# Patient Record
Sex: FEMALE | Race: WHITE | URBAN - METROPOLITAN AREA
[De-identification: names, ages, dates, MRNs, and addresses within clinical notes are randomized per-mention and may not be internally consistent; named-entity substitution may affect disease eponyms.]

---

## 2024-10-23 ENCOUNTER — OFFICE VISIT (OUTPATIENT)
Dept: GASTROENTEROLOGY | Facility: CLINIC | Age: 44
End: 2024-10-23
Payer: COMMERCIAL

## 2024-10-23 ENCOUNTER — TELEPHONE (OUTPATIENT)
Facility: CLINIC | Age: 44
End: 2024-10-23

## 2024-10-23 VITALS
HEIGHT: 64 IN | HEART RATE: 85 BPM | SYSTOLIC BLOOD PRESSURE: 105 MMHG | DIASTOLIC BLOOD PRESSURE: 69 MMHG | WEIGHT: 117 LBS | BODY MASS INDEX: 19.97 KG/M2

## 2024-10-23 DIAGNOSIS — Z12.11 COLON CANCER SCREENING: ICD-10-CM

## 2024-10-23 DIAGNOSIS — Z12.11 COLON CANCER SCREENING: Primary | ICD-10-CM

## 2024-10-23 DIAGNOSIS — K59.00 CONSTIPATION, UNSPECIFIED CONSTIPATION TYPE: Primary | ICD-10-CM

## 2024-10-23 PROBLEM — C44.41 BASAL CELL CARCINOMA (BCC) OF SCALP: Status: ACTIVE | Noted: 2019-10-04

## 2024-10-23 PROCEDURE — 99244 OFF/OP CNSLTJ NEW/EST MOD 40: CPT | Performed by: NURSE PRACTITIONER

## 2024-10-23 PROCEDURE — 3078F DIAST BP <80 MM HG: CPT | Performed by: NURSE PRACTITIONER

## 2024-10-23 PROCEDURE — 3074F SYST BP LT 130 MM HG: CPT | Performed by: NURSE PRACTITIONER

## 2024-10-23 PROCEDURE — 3008F BODY MASS INDEX DOCD: CPT | Performed by: NURSE PRACTITIONER

## 2024-10-23 RX ORDER — POLYETHYLENE GLYCOL 3350, SODIUM CHLORIDE, SODIUM BICARBONATE, POTASSIUM CHLORIDE 420; 11.2; 5.72; 1.48 G/4L; G/4L; G/4L; G/4L
POWDER, FOR SOLUTION ORAL
Qty: 4000 ML | Refills: 0 | Status: SHIPPED | OUTPATIENT
Start: 2024-10-23

## 2024-10-23 NOTE — PATIENT INSTRUCTIONS
-miralax    1. Schedule colonoscopy with IV or MAC kate/ general pool MD [Diagnosis: crc screening]    2.  bowel prep from pharmacy ([facility])    3.   For cardiology patients and patients on blood thinners:  Please contact your cardiology clinic for clearance to proceed with the endoscopic procedure. If you are on blood thinners, please also confirm with your cardiologic clinic that you are able to hold the blood thinner per our recommendations.\"    BLOOD THINNER ORDERS:  -Hold for 48 hours (Xarelto, Eliquis, Pradaxa, Savaysa)  -Hold for 3 days (Pletal)  -Hold for 5 days (Coumadin, Plavix, Brilinta, Aggrenox)  -Hold for 7 days (Effient)     For endocrinology insulin patients:    Please contact your endocrinology clinic for insulin adjustment orders prior to your endoscopic procedure.    4. Read all bowel prep instructions carefully. Bowel prep instructions can also be found online at:  www.eehealth.org/giprep     5. AVOID seeds, nuts, popcorn, raw fruits and vegetables for 3 days before procedure    6.  If you start any NEW medication after your visit today, please notify us. Certain medications (like iron or weight loss medications) will need to be held before the procedure, or the procedure cannot be performed safely.

## 2024-10-23 NOTE — TELEPHONE ENCOUNTER
Scheduled for: Colonoscopy 28370    Provider Name: Dr Gallardo    Date: Sat 11/09/2024   Location: [facility]  Sedation: IV    Time: 12:05 pm, (pt is aware that Endo will call the day before to confirm arrival time)  Prep: split trilyte    Meds/Allergies Reconciled?: reviewed by provider  Diagnosis with codes: colon screening Z12.11   Was patient informed to call insurance with codes (Y/N): Yes   Referral sent?: Yes, BCBS PPO    [facility] or [facility] notified?: I sent an electronic request to Endo Scheduling and received a confirmation today.     Medication Orders: Patient is aware to NOT take iron pills, herbal meds and diet supplements for 7 days before exam. Also to NOT take any form of alcohol, recreational drugs and any forms of ED meds 24-72 hours before exam.     Misc Orders: MyChart instructions       Further instructions given by staff: Instructions given in office and pt verbalized understanding

## 2024-10-23 NOTE — H&P
[facility] - Gastroenterology                                                                                                               Reason for consult: eval    Requesting physician or provider: No primary care provider on file.    Chief Complaint   Patient presents with    Colonoscopy Screening       HPI:   Cary Segundo is a 44 year old year-old female without significant PMHx:    she is here today for evaluation prior to cln    she moves her bowels 3-4x/week and without recent change. she denies straining and/or incomplete evacuation.  she denies brbpr and/or melena.    she denies acid reflux and/or heartburn. she denies dysphagia, odynophagia and/or globus. she denies abdominal pain. she denies nausea and/or vomiting.  she denies recent change in appetite and/or unintentional weight loss.    NSAIDS: prn  Tobacco: no  Alcohol: rare  Marijuana: no  Illicit drugs: no    No FH GI malignancy    No history of adverse reaction to sedation  No ANASTASIA  No anticoagulants  No pacemaker/defibrillator  No pain medications and/or sleep aides      Last colonoscopy: no  Last EGD: no    Wt Readings from Last 6 Encounters:   10/23/24 117 lb (53.1 kg)        History, Medications, Allergies, ROS:      History reviewed. No pertinent past medical history.   History reviewed. No pertinent surgical history.   Family Hx: History reviewed. No pertinent family history.   Social History:   Social History     Socioeconomic History    Marital status:    Tobacco Use    Smoking status: Never    Smokeless tobacco: Never   Substance and Sexual Activity    Alcohol use: Yes    Drug use: Not Currently     Social Drivers of Health     Financial Resource Strain: Low Risk  (2/29/2024)    Received from [facility]    Overall Financial Resource Strain (CARDIA)     Difficulty of Paying Living Expenses: Not hard at all   Food  Insecurity: No Food Insecurity (2/29/2024)    Received from [facility]    Hunger Vital Sign     Worried About Running Out of Food in the Last Year: Never true     Ran Out of Food in the Last Year: Never true   Transportation Needs: No Transportation Needs (2/29/2024)    Received from [facility]    PRAPARE - Transportation     Lack of Transportation (Medical): No     Lack of Transportation (Non-Medical): No   Housing Stability: Low Risk  (2/29/2024)    Received from [facility]    Housing Stability Vital Sign     Unable to Pay for Housing in the Last Year: No     Number of Places Lived in the Last Year: 1     Unstable Housing in the Last Year: No        Medications (Active prior to today's visit):  No current outpatient medications on file.       Allergies:  Allergies[1]    ROS:   CONSTITUTIONAL: negative for fevers, chills, sweats and weight loss  EYES Negative for red eyes, yellow eyes, changes in vision  HEENT: Negative for dysphagia and hoarseness  RESPIRATORY: Negative for cough and shortness of breath  CARDIOVASCULAR: Negative for chest pain, palpitations  GASTROINTESTINAL: See HPI  GENITOURINARY: Negative for dysuria and frequency  MUSCULOSKELETAL: Negative for arthralgias and myalgias  NEUROLOGICAL: Negative for dizziness and headaches  BEHAVIOR/PSYCH: Negative for anxiety and poor appetite    PHYSICAL EXAM:   Blood pressure 105/69, pulse 85, height 5' 4\" (1.626 m), weight 117 lb (53.1 kg).    GEN: WD/WN, NAD  HEENT: Supple symmetrical, trachea midline  CV: RRR, the extremities are warm and well perfused   LUNGS: No increased work of breathing  ABDOMEN: No scars, normal bowel sounds, soft, non-tender, non-distended no rebound or guarding, no masses, no hepatomegaly  MSK: No redness, no warmth, no swelling of joints  SKIN: No jaundice, no erythema, no rashes  HEMATOLOGIC: No bleeding, no bruising  NEURO: Alert and interactive, normal  gait    Labs/Imaging/Procedures:     Patient's pertinent labs and imaging were reviewed and discussed with patient today.        .  ASSESSMENT/PLAN:   Cary Segundo is a 44 year old year-old female without significant PMHx:    #constipation  Chronic and w/o red flags.  CBC, TSH normal 3/2024.  No fhx gi malignancy. Has not had cln and due for screening.    -miralax    1. Schedule colonoscopy with IV or MAC w/ general pool MD [Diagnosis: crc screening]    2.  bowel prep from pharmacy ([facility])    3.   For cardiology patients and patients on blood thinners:  Please contact your cardiology clinic for clearance to proceed with the endoscopic procedure. If you are on blood thinners, please also confirm with your cardiologic clinic that you are able to hold the blood thinner per our recommendations.\"    BLOOD THINNER ORDERS:  -Hold for 48 hours (Xarelto, Eliquis, Pradaxa, Savaysa)  -Hold for 3 days (Pletal)  -Hold for 5 days (Coumadin, Plavix, Brilinta, Aggrenox)  -Hold for 7 days (Effient)     For endocrinology insulin patients:    Please contact your endocrinology clinic for insulin adjustment orders prior to your endoscopic procedure.    4. Read all bowel prep instructions carefully. Bowel prep instructions can also be found online at:  www.[email or web address]/giprep     5. AVOID seeds, nuts, popcorn, raw fruits and vegetables for 3 days before procedure    6.  If you start any NEW medication after your visit today, please notify us. Certain medications (like iron or weight loss medications) will need to be held before the procedure, or the procedure cannot be performed safely.        Orders This Visit:  No orders of the defined types were placed in this encounter.      Meds This Visit:  Requested Prescriptions      No prescriptions requested or ordered in this encounter       Imaging & Referrals:  None    ENDOSCOPIC RISK BENEFIT DISCUSSION: I described the procedure in great detail with the patient. I discussed  the risks and benefits, including but not limited to: bleeding, perforation, infection, anesthesia complications, and even death. Patient will be NPO after midnight and will have a person physically present at time of pick-up to drive patient home. Patient verbalized understanding and agrees to proceed with procedure as planned.    Soha Trejo, APRN   10/23/2024        This note was partially prepared using Dragon Medical voice recognition dictation software. As a result, errors may occur. When identified, these errors have been corrected. While every attempt is made to correct errors during dictation, discrepancies may still exist.          [1]   Allergies  Allergen Reactions    Amoxicillin HIVES    Bee Venom SWELLING     marked local swelling of her hand that required steroids.    Cerner Allergy Text Annotation: Bee Stings, Cerner Reaction: swelling    Seasonal SWELLING

## 2024-10-23 NOTE — TELEPHONE ENCOUNTER
Schedulers, see providers orders below:     -Patient was seen in office today and was provided with written and verbal procedure prep instructions, including any medication adjustments.   -Patient was advised to call medical insurance for any questions on benefits and/or any out of pocket costs.   -Patient is aware that the GI schedulers will be calling to schedule procedure(s) and that providers may not have any availability until possibly end of the year.           Instructions and Information about Your Health       -[employer]     1. Schedule colonoscopy with IV or MAC w/ general pool MD [Diagnosis: crc screening]     2.  bowel prep from pharmacy ([facility])     3.   For cardiology patients and patients on blood thinners:  Please contact your cardiology clinic for clearance to proceed with the endoscopic procedure. If you are on blood thinners, please also confirm with your cardiologic clinic that you are able to hold the blood thinner per our recommendations.\"     BLOOD THINNER ORDERS:  -Hold for 48 hours (Xarelto, Eliquis, Pradaxa, Savaysa)  -Hold for 3 days (Pletal)  -Hold for 5 days (Coumadin, Plavix, Brilinta, Aggrenox)  -Hold for 7 days (Effient)      For endocrinology insulin patients:     Please contact your endocrinology clinic for insulin adjustment orders prior to your endoscopic procedure.     4. Read all bowel prep instructions carefully. Bowel prep instructions can also be found online at:  www.eehealth.org/giprep      5. AVOID seeds, nuts, popcorn, raw fruits and vegetables for 3 days before procedure     6.  If you start any NEW medication after your visit today, please notify us. Certain medications (like iron or weight loss medications) will need to be held before the procedure, or the procedure cannot be performed safely.

## 2024-10-23 NOTE — TELEPHONE ENCOUNTER
Rescheduled for: Colonoscopy 49193    Provider Name: Dr Gallardo  TO Dr. Hurt   Date: Sat 11/09/2024 TO 2/28/2025  Location: [facility]  Sedation: IV  TO Mac  Time: 12:05 pm, TO 10:50 (pt is aware that Endo will call the day before to confirm arrival time)  Prep: split trilyte    Meds/Allergies Reconciled?: reviewed by provider  Diagnosis with codes: colon screening Z12.11   Was patient informed to call insurance with codes (Y/N): Yes   Referral sent?: Yes, BCBS PPO    EM or [facility] notified?: I sent an electronic request to Endo Scheduling and received a confirmation today.  Medication Orders: Patient is aware to NOT take iron pills, herbal meds and diet supplements for 7 days before exam. Also to NOT take any form of alcohol, recreational drugs and any forms of ED meds 24-72 hours before exam.  Misc Orders: MyChart instructions       Further instructions given by staff:I discussed the prep instructions with the patient which she verbally understood and is aware that I will mychart  the instructions today.

## 2025-02-13 ENCOUNTER — TELEPHONE (OUTPATIENT)
Facility: CLINIC | Age: 45
End: 2025-02-13

## 2025-05-16 RX ORDER — LEVOTHYROXINE SODIUM 75 UG/1
75 TABLET ORAL
COMMUNITY

## 2025-05-23 ENCOUNTER — ANESTHESIA EVENT (OUTPATIENT)
Dept: ENDOSCOPY | Facility: HOSPITAL | Age: 45
End: 2025-05-23
Payer: COMMERCIAL

## 2025-05-23 ENCOUNTER — ANESTHESIA (OUTPATIENT)
Dept: ENDOSCOPY | Facility: HOSPITAL | Age: 45
End: 2025-05-23
Payer: COMMERCIAL

## 2025-05-23 ENCOUNTER — HOSPITAL ENCOUNTER (OUTPATIENT)
Facility: HOSPITAL | Age: 45
Setting detail: HOSPITAL OUTPATIENT SURGERY
Discharge: HOME | End: 2025-05-23
Attending: STUDENT IN AN ORGANIZED HEALTH CARE EDUCATION/TRAINING PROGRAM | Admitting: STUDENT IN AN ORGANIZED HEALTH CARE EDUCATION/TRAINING PROGRAM
Payer: COMMERCIAL

## 2025-05-23 VITALS
WEIGHT: 120 LBS | BODY MASS INDEX: 22.66 KG/M2 | HEART RATE: 52 BPM | RESPIRATION RATE: 14 BRPM | HEIGHT: 61 IN | SYSTOLIC BLOOD PRESSURE: 101 MMHG | OXYGEN SATURATION: 100 % | DIASTOLIC BLOOD PRESSURE: 63 MMHG

## 2025-05-23 DIAGNOSIS — Z12.11 COLON CANCER SCREENING: ICD-10-CM

## 2025-05-23 LAB — B-HCG UR QL: NEGATIVE

## 2025-05-23 PROCEDURE — 45385 COLONOSCOPY W/LESION REMOVAL: CPT | Performed by: STUDENT IN AN ORGANIZED HEALTH CARE EDUCATION/TRAINING PROGRAM

## 2025-05-23 RX ORDER — LIDOCAINE HYDROCHLORIDE 10 MG/ML
INJECTION, SOLUTION EPIDURAL; INFILTRATION; INTRACAUDAL; PERINEURAL AS NEEDED
Status: DISCONTINUED | OUTPATIENT
Start: 2025-05-23 | End: 2025-05-23 | Stop reason: SURG

## 2025-05-23 RX ORDER — SODIUM CHLORIDE, SODIUM LACTATE, POTASSIUM CHLORIDE, CALCIUM CHLORIDE 600; 310; 30; 20 MG/100ML; MG/100ML; MG/100ML; MG/100ML
INJECTION, SOLUTION INTRAVENOUS CONTINUOUS
Status: DISCONTINUED | OUTPATIENT
Start: 2025-05-23 | End: 2025-05-23

## 2025-05-23 RX ORDER — NALOXONE HYDROCHLORIDE 0.4 MG/ML
0.08 INJECTION, SOLUTION INTRAMUSCULAR; INTRAVENOUS; SUBCUTANEOUS ONCE AS NEEDED
Status: DISCONTINUED | OUTPATIENT
Start: 2025-05-23 | End: 2025-05-23

## 2025-05-23 RX ADMIN — LIDOCAINE HYDROCHLORIDE 50 MG: 10 INJECTION, SOLUTION EPIDURAL; INFILTRATION; INTRACAUDAL; PERINEURAL at 10:08:00

## 2025-05-23 RX ADMIN — SODIUM CHLORIDE, SODIUM LACTATE, POTASSIUM CHLORIDE, CALCIUM CHLORIDE: 600; 310; 30; 20 INJECTION, SOLUTION INTRAVENOUS at 10:03:00

## 2025-05-23 NOTE — ANESTHESIA PREPROCEDURE EVALUATION
Anesthesia PreOp Note    HPI:     Cary Segundo is a 44 year old female who presents for preoperative consultation requested by: Rajinder Hurt MD    Date of Surgery: 5/23/2025    Procedure(s):  COLONOSCOPY  Indication: Colon cancer screening    Relevant Problems   No relevant active problems       NPO:  Last Liquid Consumption Date: 05/23/25  Last Liquid Consumption Time: 0615  Last Solid Consumption Date: 05/22/25  Last Solid Consumption Time: 0900  Last Liquid Consumption Date: 05/23/25          History Review:  Patient Active Problem List    Diagnosis Date Noted    Basal cell carcinoma (BCC) of scalp 10/04/2019       Past Medical History[1]    Past Surgical History[2]    Prescriptions Prior to Admission[3]  Current Medications and Prescriptions Ordered in Epic[4]    Allergies[5]    Family History[6]  Social Hx on file[7]    Available pre-op labs reviewed.             Vital Signs:  Body mass index is 22.67 kg/m².   height is 1.549 m (5' 1\") and weight is 54.4 kg (120 lb). Her blood pressure is 108/66 and her pulse is 53. Her respiration is 18 and oxygen saturation is 100%.   Vitals:    05/16/25 1524 05/23/25 1001   BP:  108/66   Pulse:  53   Resp:  18   SpO2:  100%   Weight: 54.4 kg (120 lb)    Height: 1.549 m (5' 1\")         Anesthesia Evaluation     Patient summary reviewed and Nursing notes reviewed    No history of anesthetic complications   Airway   Mallampati: I  TM distance: >3 FB  Neck ROM: full  Dental - Dentition appears grossly intact     Pulmonary - negative ROS and normal exam    breath sounds clear to auscultation  Cardiovascular - negative ROS  Exercise tolerance: good    Rhythm: regular  Rate: normal    Neuro/Psych - negative ROS     GI/Hepatic/Renal - negative ROS     Endo/Other - negative ROS   Abdominal  - normal exam    Abdomen: soft.                 Anesthesia Plan:   ASA:  1  Plan:   MAC  Informed Consent Plan and Risks Discussed With:  Patient and spouse      I have informed Cary  RAAFL Segundo and/or legal guardian or family member of the nature of the anesthetic plan, benefits, risks including possible dental damage if relevant, major complications, and any alternative forms of anesthetic management.   All of the patient's questions were answered to the best of my ability. The patient desires the anesthetic management as planned.  Tammy Wilcox CRNA  5/23/2025 10:03 AM  Present on Admission:  **None**           [1]   Past Medical History:   Blood disorder    thrombocytopenia    Cancer (HCC)    basal cell to scalp    Disorder of thyroid    Visual impairment    glasses/contacts   [2]   Past Surgical History:  Procedure Laterality Date    Colonoscopy  05/23/2025   [3]   Medications Prior to Admission   Medication Sig Dispense Refill Last Dose/Taking    levothyroxine 75 MCG Oral Tab Take 1 tablet (75 mcg total) by mouth before breakfast.   5/22/2025    PEG 3350-KCl-Na Bicarb-NaCl (TRILYTE) 420 g Oral Recon Soln Take prep as directed by gastro office. May substitute with Trilyte/generic equivalent if needed. 4000 mL 0 Taking   [4]   Current Facility-Administered Medications Ordered in Epic   Medication Dose Route Frequency Provider Last Rate Last Admin    lactated ringers infusion   Intravenous Continuous Rajinder Hurt MD         No current [facility]-ordered outpatient medications on file.   [5]   Allergies  Allergen Reactions    Amoxicillin HIVES    Bee Venom SWELLING     marked local swelling of her hand that required steroids.    Cerner Allergy Text Annotation: Bee Stings, Cerner Reaction: swelling    Seasonal SWELLING   [6] History reviewed. No pertinent family history.  [7]   Social History  Socioeconomic History    Marital status:    Tobacco Use    Smoking status: Never    Smokeless tobacco: Never   Vaping Use    Vaping status: Never Used   Substance and Sexual Activity    Alcohol use: Yes     Comment: occ    Drug use: Not Currently

## 2025-05-23 NOTE — H&P
History & Physical Examination    Patient Name: Cary Segundo  MRN: [id number]  CSN: [id number]  YOB: 1980    Diagnosis: screening for colon cancer    Prescriptions Prior to Admission[1]  Current Hospital Medications[2]    Allergies: Allergies[3]    Past Medical History[4]  Past Surgical History[5]  Family History[6]  Social History     Tobacco Use    Smoking status: Never    Smokeless tobacco: Never   Substance Use Topics    Alcohol use: Yes     Comment: occ       SYSTEM Check if Review is Normal Check if Physical Exam is Normal If not normal, please explain:   HEENT [X ] [ X]    NECK  [X ] [ X]    HEART [X ] [ X]    LUNGS [X ] [ X]    ABDOMEN [X ] [ X]    EXTREMITIES [X ] [ X]    OTHER        I have discussed the risks and benefits and alternatives of the procedure with the patient/family.  They understand and agree to proceed with plan of care.   I have reviewed the History and Physical done within the last 30 days.  Any changes noted above.    Rajinder Hurt MD  [facility] Gastroenterology                   [1]   Medications Prior to Admission   Medication Sig Dispense Refill Last Dose/Taking    levothyroxine 75 MCG Oral Tab Take 1 tablet (75 mcg total) by mouth before breakfast.   Taking    PEG 3350-KCl-Na Bicarb-NaCl (TRILYTE) 420 g Oral Recon Soln Take prep as directed by gastro office. May substitute with Trilyte/generic equivalent if needed. 4000 mL 0 Taking   [2]   Current Facility-Administered Medications   Medication Dose Route Frequency    lactated ringers infusion   Intravenous Continuous   [3]   Allergies  Allergen Reactions    Amoxicillin HIVES    Bee Venom SWELLING     marked local swelling of her hand that required steroids.    Cerner Allergy Text Annotation: Bee Stings, Cerner Reaction: swelling    Seasonal SWELLING   [4]   Past Medical History:   Blood disorder    thrombocytopenia    Cancer (HCC)    basal cell to scalp    Disorder of thyroid    Visual impairment     glasses/contacts   [5]   Past Surgical History:  Procedure Laterality Date    Colonoscopy  05/23/2025   [6] History reviewed. No pertinent family history.

## 2025-05-23 NOTE — DISCHARGE INSTRUCTIONS
Home Care Instructions for Colonoscopy with Sedation    Diet:  - Resume your regular diet as tolerated unless otherwise instructed.  - Start with light meals to minimize bloating.  - Do not drink alcohol today.    Medication:  - If you have questions about resuming your normal medications, please contact your Primary Care Physician.    Activities:  - Take it easy today. Do not return to work today.  - Do not drive today.  - Do not operate any machinery today (including kitchen equipment).    Colonoscopy:  - You may notice some rectal \"spotting\" (a little blood on the toilet tissue) for a day or two after the exam. This is normal.  - If you experience any rectal bleeding (not spotting), persistent tenderness or sharp severe abdominal pains, oral temperature over 100 degrees Fahrenheit, light-headedness or dizziness, or any other problems, contact your doctor.      **If unable to reach your doctor, please go to the [facility] Emergency Room**    - Your referring physician will receive a full report of your examination.  - If you do not hear from your doctor's office within two weeks of your biopsy, please call them for your results.    You may be able to see your laboratory results in Digital H2O between 4 and 7 business days.  In some cases, your physician may not have viewed the results before they are released to Digital H2O.  If you have questions regarding your results contact the physician who ordered the test/exam by phone or via Digital H2O by choosing \"Ask a Medical Question.\"

## 2025-05-23 NOTE — ANESTHESIA POSTPROCEDURE EVALUATION
Patient: Cary Segundo    Procedure Summary       Date: 05/23/25 Room / Location: [facility] ENDOSCOPY 04 / [facility] ENDOSCOPY    Anesthesia Start: 1011 Anesthesia Stop: 1056    Procedure: COLONOSCOPY Diagnosis:       Colon cancer screening      (polyp)    Surgeons: Rajinder Hurt MD Anesthesiologist: Tammy Wilcox CRNA    Anesthesia Type: MAC ASA Status: 1            Anesthesia Type: MAC    Vitals Value Taken Time   BP 90/58 05/23/25 10:55   Temp 97 05/23/25 10:56   Pulse 58 05/23/25 10:56   Resp 17 05/23/25 10:56   SpO2 100 % 05/23/25 10:56   Vitals shown include unfiled device data.    [facility] AN Post Evaluation:   Patient Evaluated in PACU  Patient Participation: waiting for patient participation  Level of Consciousness: lethargic and responsive to painful stimuli  Pain Score: 0  Pain Management: adequate  Airway Patency:patent  Yes    Nausea/Vomiting: none  Cardiovascular Status: acceptable and blood pressure returned to baseline  Respiratory Status: acceptable  Postoperative Hydration acceptable  Comments: PATIENT TOLERATE PROCEDURE WELL.      Tammy Wilcox CRNA  5/23/2025 10:56 AM

## 2025-05-23 NOTE — OPERATIVE REPORT
COLONOSCOPY REPORT    Cary Segundo     10/10/1980 Age 44 year old   PCP Jayda Mae MD Endoscopist Rajinder Hurt MD       Date of procedure: 25    Procedure: Colonoscopy w/ cold snare polypectomy     Pre-operative diagnosis: screening    Post-operative diagnosis: see impression    Medications: MAC    Withdrawal time: 19 minutes    Procedure:  Informed consent was obtained from the patient after the risks of the procedure were discussed, including but not limited to bleeding, perforation, aspiration, infection, or possibility of a missed lesion. After discussions of the risks/benefits and alternatives to this procedure, as well as the planned sedation, the patient was placed in the left lateral decubitus position and begun on continuous blood pressure pulse oximetry and EKG monitoring and this was maintained throughout the procedure. Once an adequate level of sedation was obtained a digital rectal exam was completed. Then the lubricated tip of the Pediatric [id number] diagnostic video colonoscope was inserted and advanced without difficulty to the cecum using water immersion and CO2 insufflation technique. The cecum was identified by localizing the trifold, the appendix and the ileocecal valve. Withdrawal was begun with thorough washing and careful examination of the colonic walls and folds. A routine second examination of the cecum/ascending colon was performed. Photodocumentation was obtained. The bowel prep was fair. Views of the colon were good/improved with washing. I then carefully withdrew the instrument from the patient who tolerated the procedure well.     Complications: none.    Findings:   1. 1 polyp noted as follows:      A. 4 mm polyp in the rectum; sessile morphology; cold snare polypectomy performed, polyp retrieved.    2. Diverticulosis: No large diverticula.    3. Ileocecal valve appeared healthy and normal.    4. The colonic mucosa throughout the colon showed  normal vascular pattern, without evidence of angioectasias or inflammation. There was a lot of food debris scattered throughout the colon that was aggressively irrigated and suctioned with significant improvement of views after this. NO stool seen, just food debris.     5. A retroflexed view of the rectum revealed small internal hemorrhoids.    6. AISHA: normal rectal tone, no masses palpated.     Impression:   4 mm polyp removed.  Scattered food debris seen throughout the colon, extensively irrigated/suctioned through the scope with improved views.  Small hemorrhoids.  The colon was otherwise normal with glistening mucosa and intact vascular pattern.    Recommend:  Await pathology. The interval for the next colonoscopy will be determined after reviewing pathology. If new signs or symptoms develop, colonoscopy may need to be repeated sooner.   High fiber diet.  Monitor for blood in the stool. If having more than just tinge of blood, call office or go to the ER.  Repeat in 3 years given polyp found and scattered food debris.    >>>If tissue was obtained and you have not received your pathology results either by phone or letter within 2 weeks, please call our office at [phone].    Specimens: polyp    Blood loss: <1 ml

## 2025-05-27 ENCOUNTER — TELEPHONE (OUTPATIENT)
Facility: CLINIC | Age: 45
End: 2025-05-27

## 2025-05-27 NOTE — TELEPHONE ENCOUNTER
Recall colonoscopy in 10 years per Dr. Hurt.     Last done 5/23/2025.     Recall entered into patient outreach for  5/23/2035.     Health maintenance updated.

## 2025-05-27 NOTE — TELEPHONE ENCOUNTER
GI Staff:    Can you please place a recall to have this patient repeat a colonoscopy in 10 years.    Thank you.    Rajinder Hurt MD

## (undated) DEVICE — KIT CLEAN ENDOKIT 1.1OZ GOWNX2

## (undated) DEVICE — Device

## (undated) DEVICE — 60 ML SYRINGE REGULAR TIP: Brand: MONOJECT

## (undated) DEVICE — MEDI-VAC NON-CONDUCTIVE SUCTION TUBING 6MM X 1.8M (6FT.) L: Brand: CARDINAL HEALTH

## (undated) DEVICE — V2 SPECIMEN COLLECTION MANIFOLD KIT: Brand: NEPTUNE

## (undated) DEVICE — V2 SPECIMEN COLLECTION TRAY: Brand: NEPTUNE

## (undated) DEVICE — KIT ENDO ORCAPOD 160/180/190

## (undated) DEVICE — LASSO POLYPECTOMY SNARE: Brand: LASSO

## (undated) NOTE — LETTER
[facility]  Administration of Anesthesia  ICary agree to be cared for by a physician anesthesiologist alone and/or with a nurse anesthetist, who is specially trained to monitor me and give me medicine to put me to sleep or keep me comfortable during my procedure    I understand that my anesthesiologist and/or anesthetist is not an employee or agent of [facility] or [employer]. He or she works for [facility]    As the patient asking for anesthesia services, I agree to:  Allow the anesthesiologist (anesthesia doctor) to give me medicine and do additional procedures as necessary. Some examples are: Starting or using an “IV” to give me medicine, fluids or blood during my procedure, and having a breathing tube placed to help me breathe when I’m asleep (intubation). In the event that my heart stops working properly, I understand that my anesthesiologist will make every effort to sustain my life, unless otherwise directed by [facility] Do Not Resuscitate documents.  Tell my anesthesia doctor before my procedure:  If I am pregnant.  The last time that I ate or drank.  iii. All of the medicines I take (including prescriptions, herbal supplements, and pills I can buy without a prescription (including street drugs/illegal medications). Failure to inform my anesthesiologist about these medicines may increase my risk of anesthetic complications.  iv.If I am allergic to anything or have had a reaction to anesthesia before.  I understand how the anesthesia medicine will help me (benefits).  I understand that with any type of anesthesia medicine there are risks:  The most common risks are: nausea, vomiting, sore throat, muscle soreness, damage to my eyes, mouth, or teeth (from breathing tube placement).  Rare risks include: remembering what happened during my procedure, allergic reactions to medications, injury to my airway, heart, lungs, vision, nerves, or  muscles and in extremely rare instances death.  My doctor has explained to me other choices available to me for my care (alternatives).  Pregnant Patients (“epidural”):  I understand that the risks of having an epidural (medicine given into my back to help control pain during labor), include itching, low blood pressure, difficulty urinating, headache or slowing of the baby’s heart. Very rare risks include infection, bleeding, seizure, irregular heart rhythms and nerve injury.  Regional Anesthesia (“spinal”, “epidural”, & “nerve blocks”):  I understand that rare but potential complications include headache, bleeding, infection, seizure, irregular heart rhythms, and nerve injury.    _____________________________________________________________________________  Patient (or Representative) Signature/Relationship to Patient  Date   Time    _____________________________________________________________________________   Name (if used)    Language/Organization   Time    _____________________________________________________________________________  Nurse Anesthetist Signature     Date   Time  _____________________________________________________________________________  Anesthesiologist Signature     Date   Time  I have discussed the procedure and information above with the patient (or patient’s representative) and answered their questions. The patient or their representative has agreed to have anesthesia services.    _____________________________________________________________________________  Witness        Date   Time  I have verified that the signature is that of the patient or patient’s representative, and that it was signed before the procedure  Patient Name: Cary Segundo     : 10/10/1980                 Printed: 2025 at 11:07 AM    Medical Record #: [id number]                                            Page 1 of 1  ----------ANESTHESIA CONSENT----------

## (undated) NOTE — LETTER
[facility]  [address], [place], [place]    Authorization for Surgical Operation and Procedure                               I hereby authorize Rajinder Hurt MD, my physician and his/her assistants (if applicable), which may include medical students, residents, and/or fellows, to perform the following surgical operation/ procedure and administer such anesthesia as may be determined necessary by my physician: Operation/Procedure name (s) COLONOSCOPY on Cary Segundo   2.   I recognize that during the surgical operation/procedure, unforeseen conditions may necessitate additional or different procedures than those listed above.  I, therefore, further authorize and request that the above-named surgeon, assistants, or designees perform such procedures as are, in their judgment, necessary and desirable.    3.   My surgeon/physician has discussed prior to my surgery the potential benefits, risks and side effects of this procedure; the likelihood of achieving goals; and potential problems that might occur during recuperation.  They also discussed reasonable alternatives to the procedure, including risks, benefits, and side effects related to the alternatives and risks related to not receiving this procedure.  I have had all my questions answered and I acknowledge that no guarantee has been made as to the result that may be obtained.    4.   Should the need arise during my operation/procedure, which includes change of level of care prior to discharge, I also consent to the administration of blood and/or blood products.  Further, I understand that despite careful testing and screening of blood or blood products by collecting agencies, I may still be subject to ill effects as a result of receiving a blood transfusion and/or blood products.  The following are some, but not all, of the potential risks that can occur: fever and allergic reactions, hemolytic reactions, transmission of diseases such  as Hepatitis, AIDS and Cytomegalovirus (CMV) and fluid overload.  In the event that I wish to have an autologous transfusion of my own blood, or a directed donor transfusion, I will discuss this with my physician.  Check only if Refusing Blood or Blood Products  I understand refusal of blood or blood products as deemed necessary by my physician may have serious consequences to my condition to include possible death. I hereby assume responsibility for my refusal and release the hospital, its personnel, and my physicians from any responsibility for the consequences of my refusal.    o  Refuse   5.   I authorize the use of any specimen, organs, tissues, body parts or foreign objects that may be removed from my body during the operation/procedure for diagnosis, research or teaching purposes and their subsequent disposal by hospital authorities.  I also authorize the release of specimen test results and/or written reports to my treating physician on the hospital medical staff or other referring or consulting physicians involved in my care, at the discretion of the Pathologist or my treating physician.    6.   I consent to the photographing or videotaping of the operations or procedures to be performed, including appropriate portions of my body for medical, scientific, or educational purposes, provided my identity is not revealed by the pictures or by descriptive texts accompanying them.  If the procedure has been photographed/videotaped, the surgeon will obtain the original picture, image, videotape or CD.  The hospital will not be responsible for storage, release or maintenance of the picture, image, tape or CD.    7.   I consent to the presence of a  or observers in the operating room as deemed necessary by my physician or their designees.    8.   I recognize that in the event my procedure results in extended X-Ray/fluoroscopy time, I may develop a skin reaction.    9. If I have a Do Not Attempt  Resuscitation (DNAR) order in place, that status will be suspended while in the operating room, procedural suite, and during the recovery period unless otherwise explicitly stated by me (or a person authorized to consent on my behalf). The surgeon or my attending physician will determine when the applicable recovery period ends for purposes of reinstating the DNAR order.  10. Patients having a sterilization procedure: I understand that if the procedure is successful the results will be permanent and it will therefore be impossible for me to inseminate, conceive, or bear children.  I also understand that the procedure is intended to result in sterility, although the result has not been guaranteed.   11. I acknowledge that my physician has explained sedation/analgesia administration to me including the risk and benefits I consent to the administration of sedation/analgesia as may be necessary or desirable in the judgment of my physician.    I CERTIFY THAT I HAVE READ AND FULLY UNDERSTAND THE ABOVE CONSENT TO OPERATION and/or OTHER PROCEDURE.     ____________________________________  _________________________________        ______________________________  Signature of Patient    Signature of Responsible Person                Printed Name of Responsible Person                                      ____________________________________  _____________________________                ________________________________  Signature of Witness        Date  Time         Relationship to Patient    STATEMENT OF PHYSICIAN My signature below affirms that prior to the time of the procedure; I have explained to the patient and/or his/her legal representative, the risks and benefits involved in the proposed treatment and any reasonable alternative to the proposed treatment. I have also explained the risks and benefits involved in refusal of the proposed treatment and alternatives to the proposed treatment and have answered the patient's  questions. If I have a significant financial interest in a co-management agreement or a significant financial interest in any product or implant, or other significant relationship used in this procedure/surgery, I have disclosed this and had a discussion with my patient.     _____________________________________________________              _____________________________  (Signature of Physician)                                                                                         (Date)                                   (Time)  Patient Name: Cary Segundo      : 10/10/1980      Printed: 2025     Medical Record #: [id number]                                      Page 1 of 1